# Patient Record
Sex: MALE | Race: WHITE | NOT HISPANIC OR LATINO | Employment: FULL TIME | URBAN - METROPOLITAN AREA
[De-identification: names, ages, dates, MRNs, and addresses within clinical notes are randomized per-mention and may not be internally consistent; named-entity substitution may affect disease eponyms.]

---

## 2017-10-02 ENCOUNTER — APPOINTMENT (OUTPATIENT)
Dept: ADMISSIONS | Facility: MEDICAL CENTER | Age: 52
End: 2017-10-02
Attending: SURGERY
Payer: COMMERCIAL

## 2017-10-10 ENCOUNTER — HOSPITAL ENCOUNTER (OUTPATIENT)
Facility: MEDICAL CENTER | Age: 52
End: 2017-10-10
Attending: SURGERY | Admitting: SURGERY
Payer: COMMERCIAL

## 2017-10-10 VITALS
OXYGEN SATURATION: 99 % | DIASTOLIC BLOOD PRESSURE: 92 MMHG | BODY MASS INDEX: 23.05 KG/M2 | SYSTOLIC BLOOD PRESSURE: 124 MMHG | TEMPERATURE: 97.4 F | RESPIRATION RATE: 14 BRPM | HEART RATE: 84 BPM | WEIGHT: 173.94 LBS | HEIGHT: 73 IN

## 2017-10-10 PROBLEM — K40.91 UNILATERAL RECURRENT INGUINAL HERNIA: Status: ACTIVE | Noted: 2017-10-10

## 2017-10-10 PROCEDURE — 502240 HCHG MISC OR SUPPLY RC 0272: Performed by: SURGERY

## 2017-10-10 PROCEDURE — 160036 HCHG PACU - EA ADDL 30 MINS PHASE I: Performed by: SURGERY

## 2017-10-10 PROCEDURE — 160047 HCHG PACU  - EA ADDL 30 MINS PHASE II: Performed by: SURGERY

## 2017-10-10 PROCEDURE — 160028 HCHG SURGERY MINUTES - 1ST 30 MINS LEVEL 3: Performed by: SURGERY

## 2017-10-10 PROCEDURE — 500123 HCHG BOVIE, CONTROL W/BLADE: Performed by: SURGERY

## 2017-10-10 PROCEDURE — 500800 HCHG LAPAROSCOPIC J/L HOOK: Performed by: SURGERY

## 2017-10-10 PROCEDURE — 160025 RECOVERY II MINUTES (STATS): Performed by: SURGERY

## 2017-10-10 PROCEDURE — 502571 HCHG PACK, LAP CHOLE: Performed by: SURGERY

## 2017-10-10 PROCEDURE — 500048 HCHG BALLOON, TROCAR FOR HERNIA: Performed by: SURGERY

## 2017-10-10 PROCEDURE — 160009 HCHG ANES TIME/MIN: Performed by: SURGERY

## 2017-10-10 PROCEDURE — 160048 HCHG OR STATISTICAL LEVEL 1-5: Performed by: SURGERY

## 2017-10-10 PROCEDURE — 700102 HCHG RX REV CODE 250 W/ 637 OVERRIDE(OP)

## 2017-10-10 PROCEDURE — 700111 HCHG RX REV CODE 636 W/ 250 OVERRIDE (IP)

## 2017-10-10 PROCEDURE — 160046 HCHG PACU - 1ST 60 MINS PHASE II: Performed by: SURGERY

## 2017-10-10 PROCEDURE — 160039 HCHG SURGERY MINUTES - EA ADDL 1 MIN LEVEL 3: Performed by: SURGERY

## 2017-10-10 PROCEDURE — 160035 HCHG PACU - 1ST 60 MINS PHASE I: Performed by: SURGERY

## 2017-10-10 PROCEDURE — C1781 MESH (IMPLANTABLE): HCPCS | Performed by: SURGERY

## 2017-10-10 PROCEDURE — A9270 NON-COVERED ITEM OR SERVICE: HCPCS

## 2017-10-10 PROCEDURE — 500514 HCHG ENDOCLIP: Performed by: SURGERY

## 2017-10-10 PROCEDURE — 501583 HCHG TROCAR, THRD CAN&SEAL 5X100: Performed by: SURGERY

## 2017-10-10 PROCEDURE — 160002 HCHG RECOVERY MINUTES (STAT): Performed by: SURGERY

## 2017-10-10 PROCEDURE — 700101 HCHG RX REV CODE 250

## 2017-10-10 PROCEDURE — 501570 HCHG TROCAR, SEPARATOR: Performed by: SURGERY

## 2017-10-10 PROCEDURE — 501838 HCHG SUTURE GENERAL: Performed by: SURGERY

## 2017-10-10 PROCEDURE — 502691 HCHG STAPLER ABSORBATACK FIXTN: Performed by: SURGERY

## 2017-10-10 DEVICE — MESH 3D MAX LEFT LG - (1EA/CA): Type: IMPLANTABLE DEVICE | Status: FUNCTIONAL

## 2017-10-10 RX ORDER — LIDOCAINE HYDROCHLORIDE 10 MG/ML
0.5 INJECTION, SOLUTION INFILTRATION; PERINEURAL
Status: DISCONTINUED | OUTPATIENT
Start: 2017-10-10 | End: 2017-10-10 | Stop reason: HOSPADM

## 2017-10-10 RX ORDER — BUPIVACAINE HYDROCHLORIDE AND EPINEPHRINE 5; 5 MG/ML; UG/ML
INJECTION, SOLUTION EPIDURAL; INTRACAUDAL; PERINEURAL
Status: DISCONTINUED | OUTPATIENT
Start: 2017-10-10 | End: 2017-10-10 | Stop reason: HOSPADM

## 2017-10-10 RX ORDER — ACETAMINOPHEN 500 MG
500 TABLET ORAL ONCE
COMMUNITY

## 2017-10-10 RX ORDER — OXYCODONE HCL 5 MG/5 ML
SOLUTION, ORAL ORAL
Status: COMPLETED
Start: 2017-10-10 | End: 2017-10-10

## 2017-10-10 RX ORDER — SODIUM CHLORIDE, SODIUM LACTATE, POTASSIUM CHLORIDE, CALCIUM CHLORIDE 600; 310; 30; 20 MG/100ML; MG/100ML; MG/100ML; MG/100ML
INJECTION, SOLUTION INTRAVENOUS CONTINUOUS
Status: DISCONTINUED | OUTPATIENT
Start: 2017-10-10 | End: 2017-10-10 | Stop reason: HOSPADM

## 2017-10-10 RX ORDER — LIDOCAINE AND PRILOCAINE 25; 25 MG/G; MG/G
1 CREAM TOPICAL
Status: DISCONTINUED | OUTPATIENT
Start: 2017-10-10 | End: 2017-10-10 | Stop reason: HOSPADM

## 2017-10-10 RX ORDER — OXYCODONE HYDROCHLORIDE 5 MG/1
5-10 TABLET ORAL EVERY 4 HOURS PRN
Qty: 40 TAB | Refills: 0 | Status: SHIPPED | OUTPATIENT
Start: 2017-10-10

## 2017-10-10 RX ADMIN — SODIUM CHLORIDE, SODIUM LACTATE, POTASSIUM CHLORIDE, CALCIUM CHLORIDE: 600; 310; 30; 20 INJECTION, SOLUTION INTRAVENOUS at 07:58

## 2017-10-10 RX ADMIN — FENTANYL CITRATE 25 MCG: 50 INJECTION, SOLUTION INTRAMUSCULAR; INTRAVENOUS at 11:16

## 2017-10-10 RX ADMIN — FENTANYL CITRATE 25 MCG: 50 INJECTION, SOLUTION INTRAMUSCULAR; INTRAVENOUS at 11:21

## 2017-10-10 RX ADMIN — OXYCODONE HYDROCHLORIDE 5 MG: 5 SOLUTION ORAL at 11:00

## 2017-10-10 ASSESSMENT — PAIN SCALES - GENERAL
PAINLEVEL_OUTOF10: 2
PAINLEVEL_OUTOF10: 2
PAINLEVEL_OUTOF10: 0
PAINLEVEL_OUTOF10: 2
PAINLEVEL_OUTOF10: 0
PAINLEVEL_OUTOF10: 4
PAINLEVEL_OUTOF10: 2

## 2017-10-10 NOTE — H&P
"Surgical History and Physical    Date of Service: 10/10/2017    PCP: Derrick Craig M.D.    CC: Symptomatic left inguinal hernia    HPI: This is a 51 y.o. male who is presenting for repair of the above.    Past Medical History:  No past medical history on file.    Past Surgical History:  Past Surgical History:   Procedure Laterality Date   • OTHER  2005/2011    Hernia X2.       Medications:  Current Facility-Administered Medications   Medication Dose Route Frequency Provider Last Rate Last Dose   • lidocaine-prilocaine (EMLA) 2.5-2.5 % cream 1 Application  1 Application Topical Once PRN Lacho Garvin M.D.        Or   • lidocaine (XYLOCAINE) 1%  injection  0.5 mL Intradermal Once PRN Lacho Garvin M.D.       • lactated ringers infusion   Intravenous Continuous Lacho Garvin M.D. 10 mL/hr at 10/10/17 0758         Social History:  Social History     Social History   • Marital status:      Spouse name: N/A   • Number of children: N/A   • Years of education: N/A     Occupational History   • Not on file.     Social History Main Topics   • Smoking status: Never Smoker   • Smokeless tobacco: Current User     Types: Snuff, Chew   • Alcohol use No   • Drug use:      Types: Inhaled      Comment: Marijuana - \"weekends\". Last use 48 hours ago   • Sexual activity: Not on file     Other Topics Concern   • Not on file     Social History Narrative   • No narrative on file       Family History:  History reviewed. No pertinent family history.    Allergies:  Seasonal    Review of Systems:  Constitutional: Negative for fever, chills, weight loss, malaise/fatigue and diaphoresis.   HENT: Negative for hearing loss, ear pain, nosebleeds, congestion, sore throat, neck pain, tinnitus and ear discharge.    Eyes: Negative for blurred vision, double vision, photophobia, pain, discharge and redness.   Respiratory: Negative for cough, hemoptysis, sputum production, shortness of breath, wheezing and stridor.    Cardiovascular: " "Negative for chest pain, palpitations, orthopnea, claudication, leg swelling and PND.   Gastrointestinal: Negative for heartburn, nausea, vomiting, abdominal pain, diarrhea, constipation, blood in stool and melena.   Genitourinary: Negative for dysuria, urgency, frequency, hematuria and flank pain.   Musculoskeletal: Negative for myalgias, back pain, joint pain and falls.   Skin: Negative for itching and rash.  Neurological: Negative for dizziness, tingling, tremors, sensory change, speech change, focal weakness, seizures, loss of consciousness, weakness and headaches.   Endo/Heme/Allergies: Negative for environmental allergies and polydipsia. Does not bruise/bleed easily.   Psychiatric/Behavioral: Negative for depression, suicidal ideas, hallucinations, memory loss and substance abuse. The patient is not nervous/anxious and does not have insomnia.    Physical Exam:  Blood pressure 124/92, pulse 84, temperature 37.1 °C (98.8 °F), resp. rate 17, height 1.854 m (6' 1\"), weight 78.9 kg (173 lb 15.1 oz), SpO2 95 %.    GENERAL:  The patient is healthy-appearing and in no acute distress  HEENT:  Atraumatic, normocephalic.  Normal pinna bilaterally.  External auditory canals are without discharge.  Conjunctivae and sclerae are clear. Extraocular movements are full. Pupils are equal, round, and reactive to light.  Oral mucosa is moist.  NECK:  Soft and supple without lymphadenopathy. No masses are noted.  Trachea is midline.  CHEST:  Lungs are clear to auscultation bilaterally.  No masses, lesions, or signs of trauma were noted.    CARDIOVASCULAR:  Regular rate and rhythm.  No murmurs appreciated.  No JVD.  Palpable pulses present in all four extremities.    ABDOMEN:  Soft, non-tender, non-distended.  Non-tympanitic.  No hepatomegaly or splenomegaly.  Moderate sized, reducible left inguinal hernia is present.  GENITOURINARY:  The patient has normal external reproductive anatomy.  LYMPHATIC:  There is no adenopathy in the " cervical, supraclavicular, infraclavicular, axillary or inguinal regions.  SKIN:  Warm and well perfused. No rashes.  NEUROLOGIC:  Alert and oriented. Cranial nerves II through XII are grossly intact. Motor and sensory exams are normal in all four extremities. Motor and sensory reflexes are 2+ and symmetric with bilateral plantar responses.  PSYCHIATRIC: Affect and mood is appropriate for age and condition.    Assessment:      Active Hospital Problems    Diagnosis   • Unilateral recurrent inguinal hernia [K40.91]   Left sided.  Risks and benefits of laparoscopic repair discussed in clinic.    Plan  To OR    ____________________________________   Tanner ROTH / FLO     DD: 10/10/2017   DT: 8:30 AM

## 2017-10-10 NOTE — OP REPORT
DATE OF OPERATION: 10/10/2017     PREOPERATIVE DIAGNOSIS: Recurrent left symptomatic inguinal hernia    POSTOPERATIVE DIAGNOSIS: SAME     PROCEDURE PERFORMED: Laparoscopic Left Inguinal Hernia Repair with Mesh.     SURGEON: Tanner Gavrin MD    ASSISTANT: Riddhi Ortiz PA-C    ANESTHESIOLOGIST: Lakeisha Enrique MD., MD     ANESTHESIA: General endotracheal anesthesia.     FINDINGS: Pantaloon type inguinal hernia with direct and indirect (larger) components.  Left side specific, size large 3D MAX laparoscopic mesh was used for the repair.    SPECIMEN: NONE.     ESTIMATED BLOOD LOSS: 15 mL.     PROCEDURE: Informed consent was obtained pre-operatively.  The patient voluntarily voided their urinary bladder just prior to being brought back to the OR.  The patient was taken back to the operating room and placed in supine position.  General endotracheal anesthesia was administered and the patient was intubated. Intravenous antibiotics were administered by the anesthesiologist in correct time interval. Sequential compression devices were placed. All bony prominences were protected.  The abdomen was prepped and draped in a sterile fashion.  A time-out was performed and the patient and procedure were both verified.      Marcaine 0.5% with epinephrine was used to infiltrate all port sites. A 2cm oblique incision was made along the right lower umbilicus.  The subcutaneous tissues were spread bluntly to expose the fascia.  The anterior fascia was open sharply along the entire length of the incision to expose the right rectus muscular bundle.  This muscular bundle was swept laterally to expose the peritoneum.  A 10mm dissecting balloon was then guided through the incision, along the pre-peritoneal space, and down to the pubic symphysis.  The balloon was then hand-pump inflated under direct visualization.  The balloon was deflated and removed.  A 10mm balloon port was placed through the incision, the balloon was inflated, and the  collar was cinched down to the skin level to secure the port.  Gas was applied to the port and pneumo-preperitoneum was achieved.  A 10mm laparoscope was placed through the port.  There was no evidence of major vascular injury or injury to the peritoneum.  Dissection by the dissecting balloon was excellent.    Two 5mm ports were placed in the lower midline between the umbilicus and the pubic symphysis under direct visualization.  Dissection was begun on the left side.  The epigastric vascular bundle was identified in its usual location.  The loose areolar tissue was swept down laterally to free the peritoneum.  Dissection continued medially until the cord structures were identified.  The cord was carefully dissected free from surrounding attachments.  The vas deferens was identified and protected.  The femoral, direct, and indirect spaces were all explored and the above mentioned findings were noted.  The area of dissection was noted to be hemostatic.    Pre-peritoneal fat was cleared off the pubic symphysis and Zoltan's ligament on the left side.  A left, large 3D Max mesh was introduced into the pre-peritoneal space through the 10mm port.  It was laid out flat and care was taken to cover the femoral, direct, and indirect spaces entirely.  The mesh was secured to the pubic symphysis and Copper's ligament each with a single absorbable tack.  The mesh overlapped the midline by 1cm.  The gas was released slowly and the meshes appeared to lay in the pre-peritoneal space without wrinkles or folds.  The 10mm port was removed.  The scotty-umbilical port site fascia was closed with an 0 vicryl suture.  The fascia was noted to be tight and well approximated.  All remaining ports were then removed.  All skin incisions were closed with interrupted 4-0 Vicryl subcuticular sutures and dressed with Dermabond.     The patient tolerated the procedure well and there were no immediate apparent complications. All sponge, sharps, and  instrument counts were correct on 2 separate occasions. The patient was awakened, extubated, and transferred to the PACU in satisfactory condition.               NSQIP Post-Operative Data:    Emergency Case?----- No   Wound Classification?----- Clean  Wound Closure?----- All Layers  ASA Classification?----- I  E    ____________________________________   Tanner ROTH / FLO     DD: 10/10/2017   DT: 10:20 AM

## 2017-10-10 NOTE — DISCHARGE INSTRUCTIONS
ACTIVITY: Rest and take it easy for the first 24 hours.  A responsible adult is recommended to remain with you during that time.  It is normal to feel sleepy.  We encourage you to not do anything that requires balance, judgment or coordination.    MILD FLU-LIKE SYMPTOMS ARE NORMAL. YOU MAY EXPERIENCE GENERALIZED MUSCLE ACHES, THROAT IRRITATION, HEADACHE AND/OR SOME NAUSEA.    FOR 24 HOURS DO NOT:  Drive, operate machinery or run household appliances.  Drink beer or alcoholic beverages.   Make important decisions or sign legal documents.    SPECIAL INSTRUCTIONS: Inguinal Hernia Repair Discharge Instructions:     1. DIET: After discharge from the hospital you may resume your normal preoperative diet without restrictions.     2. ACTIVITIES: After discharge from the hospital, you may resume full routine activities. Heavy lifting (over 15 pounds) and strenuous activities will make your incision sore and should be avoided for one month after surgery. Routine activities of daily living such as walking, going up and down stairs are acceptable.     3. DRIVING: You may drive whenever you are no longer taking narcotic pain medications and are able to perform the activities needed to drive safely, i.e. turning, bending, twisting, etc.     4. WOUND/BATHING: You may get the wound wet at any time after leaving the hospital. You may shower, but do not submerge in a bath or pool until seen for follow up. You may remove the skin glue using a finger or a pair of tweezers in one week.     5. BOWEL FUNCTION: The combination of pain medication and decreased activity level can cause constipation in otherwise normal patients. If you feel this is occurring, take a laxative (Milk of Magnesia, Ex-Lax, Senokot, Miralax, Magnesium Citrate) until the problem has resolved.     6. PAIN MEDICATION: You will be given a prescription for pain medication at discharge. Please take these as directed. It is advisable to take your medication around the  clock for the first 24 to 48 hours. You may continue any non-steroidal anti-inflammatory medications (NSAIDs) such as Advil in the post-operative period. These may and should be taken with your narcotic pain medication. It is important to remember not to take medications on an empty stomach as this may cause nausea. Do not consume alcohol while taking pain medication.     7. WHAT TO EXPECT: You may develop swelling and bruising at the base of your genitalia and within the scrotum (males) or labias (females).  This is due to bleeding from the operative site tracking down into these areas.  The bleeding typically stops within a few hours after surgery.  The bruising may take several weeks to resolve.       8. CALL IF YOU HAVE: (1) Fevers to more than 101F, (2) Unusual chest or leg pain, (3) Drainage or fluid from incision that may be foul smelling, increased tenderness or soreness at the wound or the wound edges are no longer together, redness or swelling at the incision site.  (4) Profound swelling at the incision site or in the genitals.     9. FOLLOW-UP: Call and schedule a follow up appointment in 2 weeks.     If you have any additional questions at all, please do not hesitate to call the office and speak to my medical assistant, myself, or the physician on call.     [address] [facility], [address]   [place] [address]     Tanner Garvin MD   [facility]   [phone]    DIET: To avoid nausea, slowly advance diet as tolerated, avoiding spicy or greasy foods for the first day.  Add more substantial food to your diet according to your physician's instructions.  Babies can be fed formula or breast milk as soon as they are hungry.  INCREASE FLUIDS AND FIBER TO AVOID CONSTIPATION.    SURGICAL DRESSING/BATHING: see above    FOLLOW-UP APPOINTMENT:  A follow-up appointment should be arranged with your doctor in 2 weeks; call to schedule.    You should CALL YOUR PHYSICIAN if you develop:  Fever greater than 101 degrees  F.  Pain not relieved by medication, or persistent nausea or vomiting.  Excessive bleeding (blood soaking through dressing) or unexpected drainage from the wound.  Extreme redness or swelling around the incision site, drainage of pus or foul smelling drainage.  Inability to urinate or empty your bladder within 8 hours.  Problems with breathing or chest pain.    You should call 911 if you develop problems with breathing or chest pain.  If you are unable to contact your doctor or surgical center, you should go to the nearest emergency room or urgent care center.  Physician's telephone #: [phone]    If any questions arise, call your doctor.  If your doctor is not available, please feel free to call the Surgical Center at [phone].  The Center is open Monday through Friday from 7AM to 7PM.  You can also call the [facility] HOTLINE open 24 hours/day, 7 days/week and speak to a nurse at [phone], or toll free at [phone].    A registered nurse may call you a few days after your surgery to see how you are doing after your procedure.    MEDICATIONS: Resume taking daily medication.  Take prescribed pain medication with food.  If no medication is prescribed, you may take non-aspirin pain medication if needed.  PAIN MEDICATION CAN BE VERY CONSTIPATING.  Take a stool softener or laxative such as senokot, pericolace, or milk of magnesia if needed.    Prescription given for Roxicodone.  Last pain medication given at 1100.    If your physician has prescribed pain medication that includes Acetaminophen (Tylenol), do not take additional Acetaminophen (Tylenol) while taking the prescribed medication.    Depression / Suicide Risk    As you are discharged from this Critical access hospital facility, it is important to learn how to keep safe from harming yourself.    Recognize the warning signs:  · Abrupt changes in personality, positive or negative- including increase in energy   · Giving away possessions  · Change in eating  patterns- significant weight changes-  positive or negative  · Change in sleeping patterns- unable to sleep or sleeping all the time   · Unwillingness or inability to communicate  · Depression  · Unusual sadness, discouragement and loneliness  · Talk of wanting to die  · Neglect of personal appearance   · Rebelliousness- reckless behavior  · Withdrawal from people/activities they love  · Confusion- inability to concentrate     If you or a loved one observes any of these behaviors or has concerns about self-harm, here's what you can do:  · Talk about it- your feelings and reasons for harming yourself  · Remove any means that you might use to hurt yourself (examples: pills, rope, extension cords, firearm)  · Get professional help from the community (Mental Health, Substance Abuse, psychological counseling)  · Do not be alone:Call your Safe Contact- someone whom you trust who will be there for you.  · Call your local CRISIS HOTLINE 549-2510 or 968-557-0878  · Call your local Children's Mobile Crisis Response Team [place] (074) 832-6401 or www.[facility]  · Call the toll free National Suicide Prevention Hotlines   · National Suicide Prevention Lifeline 924-059-BXKM (0210)  · National Hope Line Network 800-SUICIDE (891-1354)

## 2024-12-15 ENCOUNTER — OFFICE VISIT (OUTPATIENT)
Dept: URGENT CARE | Facility: CLINIC | Age: 59
End: 2024-12-15
Payer: COMMERCIAL

## 2024-12-15 VITALS
SYSTOLIC BLOOD PRESSURE: 136 MMHG | DIASTOLIC BLOOD PRESSURE: 92 MMHG | HEIGHT: 72 IN | OXYGEN SATURATION: 97 % | TEMPERATURE: 97.1 F | RESPIRATION RATE: 16 BRPM | WEIGHT: 154 LBS | HEART RATE: 76 BPM | BODY MASS INDEX: 20.86 KG/M2

## 2024-12-15 DIAGNOSIS — J01.90 ACUTE VIRAL SINUSITIS: ICD-10-CM

## 2024-12-15 DIAGNOSIS — B97.89 ACUTE VIRAL SINUSITIS: ICD-10-CM

## 2024-12-15 PROCEDURE — 99213 OFFICE O/P EST LOW 20 MIN: CPT

## 2024-12-15 RX ORDER — DEXAMETHASONE SODIUM PHOSPHATE 10 MG/ML
10 INJECTION INTRAMUSCULAR; INTRAVENOUS ONCE
Status: COMPLETED | OUTPATIENT
Start: 2024-12-15 | End: 2024-12-15

## 2024-12-15 RX ADMIN — DEXAMETHASONE SODIUM PHOSPHATE 10 MG: 10 INJECTION INTRAMUSCULAR; INTRAVENOUS at 15:31

## 2024-12-15 ASSESSMENT — ENCOUNTER SYMPTOMS
DIZZINESS: 0
SPUTUM PRODUCTION: 0
WHEEZING: 0
COUGH: 0
SORE THROAT: 0
SHORTNESS OF BREATH: 0
WEAKNESS: 0
BLOOD IN STOOL: 0
CHILLS: 0
FEVER: 0
EYE DISCHARGE: 0
SINUS PAIN: 1
BLURRED VISION: 0
NAUSEA: 0
HEADACHES: 0
PSYCHIATRIC NEGATIVE: 1
DIAPHORESIS: 0
VOMITING: 0
MYALGIAS: 0
DIARRHEA: 0
ABDOMINAL PAIN: 0

## 2024-12-15 NOTE — PROGRESS NOTES
Subjective:   Ibrahima Omer is a 59 y.o. male who presents for Sinus Problem (Sinus pressure, fatigue x 1 week)      HPI  Patient complains of sinus pressure and fatigue for one week.    Negative: shortness of breath, cough, sputum production, wheezing, dyspnea, rhonchi, hypoxia, respiratory distress, chest pain, body aches, fever, sore throat, muffled voice, drooling, dizziness, fatigue, weakness, sleep disturbance, post nasal drip,  vision changes, abdominal pain, nausea, vomiting, diarrhea, recent travel           Review of Systems   Constitutional:  Negative for chills, diaphoresis, fever and malaise/fatigue.   HENT:  Positive for sinus pain. Negative for congestion, ear pain and sore throat.    Eyes:  Negative for blurred vision and discharge.   Respiratory:  Negative for cough, sputum production, shortness of breath and wheezing.    Cardiovascular:  Negative for chest pain.   Gastrointestinal:  Negative for abdominal pain, blood in stool, diarrhea, nausea and vomiting.   Genitourinary: Negative.    Musculoskeletal:  Negative for myalgias.   Skin:  Negative for rash.   Neurological:  Negative for dizziness, weakness and headaches.   Endo/Heme/Allergies: Negative.    Psychiatric/Behavioral: Negative.     All other systems reviewed and are negative.      Medical History:  History reviewed. No pertinent past medical history.    Allergies:  Allergies   Allergen Reactions    Seasonal        Social history, surgical history, medications, and current problem list reviewed today in Epic.       Objective:     BP (!) 136/92 (BP Location: Left arm, Patient Position: Sitting, BP Cuff Size: Adult)   Pulse 76   Temp 36.2 °C (97.1 °F) (Temporal)   Resp 16   Ht 1.829 m (6')   Wt 69.9 kg (154 lb)   SpO2 97%     Physical Exam  Vitals reviewed.   Constitutional:       General: He is not in acute distress.     Appearance: Normal appearance. He is not ill-appearing, toxic-appearing or diaphoretic.   HENT:       Head: Normocephalic.      Jaw: There is normal jaw occlusion.      Right Ear: Tympanic membrane, ear canal and external ear normal.      Left Ear: Tympanic membrane, ear canal and external ear normal.      Nose: Congestion present. No rhinorrhea.      Right Sinus: No maxillary sinus tenderness or frontal sinus tenderness.      Left Sinus: No maxillary sinus tenderness or frontal sinus tenderness.      Mouth/Throat:      Lips: Pink.      Mouth: Mucous membranes are moist.      Tongue: Tongue does not deviate from midline.      Pharynx: Oropharynx is clear. Uvula midline. No oropharyngeal exudate, posterior oropharyngeal erythema or uvula swelling.   Eyes:      Extraocular Movements: Extraocular movements intact.      Conjunctiva/sclera: Conjunctivae normal.      Pupils: Pupils are equal, round, and reactive to light.   Cardiovascular:      Rate and Rhythm: Normal rate and regular rhythm.      Pulses: Normal pulses.      Heart sounds: Normal heart sounds.   Pulmonary:      Effort: Pulmonary effort is normal.      Breath sounds: Normal breath sounds.   Abdominal:      General: Abdomen is flat.      Palpations: Abdomen is soft.      Tenderness: There is no abdominal tenderness.   Musculoskeletal:         General: Normal range of motion.      Cervical back: Normal range of motion and neck supple.   Lymphadenopathy:      Cervical: No cervical adenopathy.   Skin:     General: Skin is warm.      Capillary Refill: Capillary refill takes less than 2 seconds.   Neurological:      General: No focal deficit present.      Mental Status: He is alert and oriented to person, place, and time.   Psychiatric:         Mood and Affect: Mood normal.         Behavior: Behavior normal.         Assessment/Plan:       Diagnosis and associated orders:     1. Acute viral sinusitis  - dexamethasone (Decadron) injection (check route below) 10 mg     Comments/MDM:   I personally reviewed prior external notes and test results pertinent to today's  visit as well as additional imaging and testing completed in clinic today.      59-year-old afebrile, hemodynamically stable male presenting with complaints of sinus congestion.  Normal pulmonary exam.  Normal ENT exam outside of nasal congestion.  No concern for bacterial sinus infection at this time.  I did discuss viral first bacterial etiology of sinus infections.  Patient encouraged to increase fluids and rest, cool-mist humidifier, zinc 75 mg daily, saline nasal rinse with distilled water, cough drops, salt water gargles, tylenol or ibuprofen for fever and/or bodyaches.  At this time I do not believe antibiotics would improve patient's symptoms.  I do believe the patient would benefit from an in clinic dose of Decadron, patient agreeable to this plan of care.  Patient encouraged to follow-up with the clinic in 48 hours for re-evaluation as needed.  Patient given strict instructions to follow up with the emergency room if they develop worsening symptoms.  Patient verbalized understanding.      Patient is clinically stable at today's acute urgent care visit. Vital signs are normal and reassuring.  No acute distress noted. Appropriate for outpatient management at this time. No red flag warnings noted.  Patient given strict instructions to follow up with emergency room if they develop any red flag warnings which were discussed in depth.  They verbalized understanding.      Differential diagnosis, natural history, supportive care, and indications for immediate follow-up discussed. All questions answered. Patient agrees with the plan of care. Advised the patient to follow-up with the primary care physician for recheck, reevaluation, and consideration of further management or the emergency room for worsening symptoms.      Please note that this dictation was created using voice recognition software. I have made every reasonable attempt to correct obvious errors, but I expect that there are errors of grammar and  possibly content that I did not discover before finalizing the note.

## 2024-12-15 NOTE — PATIENT INSTRUCTIONS
Education:  -A cough can persist for up to 6 weeks.  -Increase fluids and rest.  -Cool-mist humidifier for nighttime use.   -Practice good hand hygiene.  -Zinc 25 mg has been shown to be effective in reducing the duration of cold symptoms.   -You may use either acetaminophen or ibuprofen over-the-counter every 6-8 hours for pain or fever if that is not contraindicated with your body.    -Saline Nasal Spray and Flonase may be used for congestion. Nasal saline in the nose helps to help break up nasal secretions, and is beneficial for nasal symptoms and throat pain.  -Saline Nasal Rinse with a Neti pot or Syed med rinse can be used multiple times a day. Be sure to use distilled water or boil tap water for 10 mins. Add a saline packet. Be sure the water is not too hot (around your body temp of 98 degrees)  -Decongestants are not recommended as they can have a rebound congestion effect along with many side effects (especially if you have high blood pressure).   -Chloraseptic lozenge, warm tea with honey or  throat spray to help with discomfort.  -Salt water gargles for sore throat several times a day.

## 2025-03-01 ENCOUNTER — OFFICE VISIT (OUTPATIENT)
Dept: URGENT CARE | Facility: CLINIC | Age: 60
End: 2025-03-01
Payer: COMMERCIAL

## 2025-03-01 VITALS
DIASTOLIC BLOOD PRESSURE: 70 MMHG | TEMPERATURE: 98.8 F | SYSTOLIC BLOOD PRESSURE: 126 MMHG | RESPIRATION RATE: 15 BRPM | OXYGEN SATURATION: 96 % | HEIGHT: 72 IN | HEART RATE: 81 BPM | BODY MASS INDEX: 21.54 KG/M2 | WEIGHT: 159 LBS

## 2025-03-01 DIAGNOSIS — R09.81 CONGESTION OF NASAL SINUS: ICD-10-CM

## 2025-03-01 PROCEDURE — 99213 OFFICE O/P EST LOW 20 MIN: CPT | Performed by: STUDENT IN AN ORGANIZED HEALTH CARE EDUCATION/TRAINING PROGRAM

## 2025-03-01 PROCEDURE — 3078F DIAST BP <80 MM HG: CPT | Performed by: STUDENT IN AN ORGANIZED HEALTH CARE EDUCATION/TRAINING PROGRAM

## 2025-03-01 PROCEDURE — 3074F SYST BP LT 130 MM HG: CPT | Performed by: STUDENT IN AN ORGANIZED HEALTH CARE EDUCATION/TRAINING PROGRAM

## 2025-03-01 NOTE — PROGRESS NOTES
"Subjective:   Ibrahima Omer is a 59 y.o. male who presents for Sinus Problem (Started Wednesday night, fatigue and sinus pressure )      HPI:    5-year-old male presents with sinus congestion and fatigue that started on Wednesday evening.  He reports he had a similar episode multiple times every single time he turns into a \" bacterial infection\" and he needs either antibiotics or steroid to be can go away.  He reports fatigue and generalized malaise during these episodes.  He reports he tried Claritin-D for 1 day with no significant improvement.  - On previous instances he tried Mucinex with no improvement  -  he denies any fevers or chills  - He denies any significant cough  - He reports some mild aches in his legs  -He reports ear pain     ROS    Medications:    acetaminophen Tabs  VIAGRA PO    Allergies: Seasonal    Problem List: Ibrahima Omer does not have any pertinent problems on file.    Surgical History:  Past Surgical History:   Procedure Laterality Date    INGUINAL HERNIA LAPAROSCOPIC Left 10/10/2017    Procedure: INGUINAL HERNIA LAPAROSCOPIC;  Surgeon: Lacho Garvin M.D.;  Location: SURGERY [facility];  Service: General    OTHER  2005/2011    Hernia X2.       Past Social Hx: Ibrahima Omer  reports that he has never smoked. His smokeless tobacco use includes snuff and chew. He reports that he does not currently use drugs after having used the following drugs: Inhaled. He reports that he does not drink alcohol.     Past Family Hx:  Ibrahima Omer family history is not on file.     Problem list, medications, and allergies reviewed by myself today in Epic.     Objective:     /70 (BP Location: Left arm, Patient Position: Sitting, BP Cuff Size: Adult)   Pulse 81   Temp 37.1 °C (98.8 °F) (Temporal)   Resp 15   Ht 1.829 m (6')   Wt 72.1 kg (159 lb)   SpO2 96%   BMI 21.56 kg/m²     Physical Exam  Constitutional:       Appearance: Normal " appearance.   HENT:      Head: Normocephalic and atraumatic.      Right Ear: Tympanic membrane normal.      Left Ear: Tympanic membrane normal.      Nose: Congestion and rhinorrhea present.      Mouth/Throat:      Mouth: Mucous membranes are moist.      Pharynx: Oropharynx is clear. No oropharyngeal exudate or posterior oropharyngeal erythema.   Cardiovascular:      Rate and Rhythm: Normal rate and regular rhythm.      Pulses: Normal pulses.      Heart sounds: Normal heart sounds.   Pulmonary:      Effort: Pulmonary effort is normal.      Breath sounds: Normal breath sounds.   Neurological:      Mental Status: He is alert.         Assessment/Plan:     Diagnosis and associated orders:     1. Congestion of nasal sinus           Comments/MDM:     1. Congestion of nasal sinus  On examination patient requested either steroid or an antibiotic to fix his ailment.  - I discussed with patient that the likely diagnosis at this time was a viral upper respiratory illness but he would not like additional testing.  - I discussed that typical viral upper respiratory illnesses are treated symptomatically.  I recommend use of an expectorant/mucolytic such as Mucinex, daily nasal steroid such as Flonase or Nasacort, daily antihistamine and frequent nasal rinses.  - I discussed that I study showed the majority of sinus congestion in the first 10 days of illness are viral in nature.  - We discussed return precautions including worsening symptoms, development of fever chills any shortness of breath any significant sinus pain or ear pain or symptoms lasting greater than 10 days.  - At this point patient asked if I was going to give him antibiotic or steroid injection/Dosepak.  I discussed with patient that this is not indicated at this time and I would not be prescribing this.  He then proceeded to slam the door open and stormed out of the clinic saying he does not need to be here and is a waste of his time.           Differential  diagnosis, natural history, supportive care, and indications for immediate follow-up discussed.    Advised the patient to follow-up with the primary care physician for recheck, reevaluation, and consideration of further management.    Please note that this dictation was created using voice recognition software. I have made a reasonable attempt to correct obvious errors, but I expect that there are errors of grammar and possibly content that I did not discover before finalizing the note.    Alexis Live M.D.

## (undated) DEVICE — SET LEADWIRE 5 LEAD BEDSIDE DISPOSABLE ECG (1SET OF 5/EA)

## (undated) DEVICE — BALLOON TROCAR SPACEMAKER - STRUCTURAL (5/BX)----USE ITEM 14468---

## (undated) DEVICE — APPLIER ENDO MEDIUM CLIP (3EA/BX)

## (undated) DEVICE — DRAPE LARGE 3 QUARTER - (20/CA)

## (undated) DEVICE — GLOVE BIOGEL SZ 7.5 SURGICAL PF LTX - (50PR/BX 4BX/CA)

## (undated) DEVICE — TROCAR 5X100 NON BLADED Z-TH - READ KII (6/BX)

## (undated) DEVICE — ELECTRODE 850 FOAM ADHESIVE - HYDROGEL RADIOTRNSPRNT (50/PK)

## (undated) DEVICE — SENSOR SPO2 NEO LNCS ADHESIVE (20/BX) SEE USER NOTES

## (undated) DEVICE — SUTURE GENERAL

## (undated) DEVICE — GLOVE BIOGEL SZ 6.5 SURGICAL PF LTX (50PR/BX 4BX/CA)

## (undated) DEVICE — LACTATED RINGERS INJ 1000 ML - (14EA/CA 60CA/PF)

## (undated) DEVICE — CANISTER SUCTION 3000ML MECHANICAL FILTER AUTO SHUTOFF MEDI-VAC NONSTERILE LF DISP  (40EA/CA)

## (undated) DEVICE — SUTURE 4-0 MONOCRYL PLUS PS-2 - 27 INCH (36/BX)

## (undated) DEVICE — TUBE E-T HI-LO CUFF 8.0MM (10EA/PK)

## (undated) DEVICE — MASK ANESTHESIA ADULT  - (100/CA)

## (undated) DEVICE — DETERGENT RENUZYME PLUS 10 OZ PACKET (50/BX)

## (undated) DEVICE — TUBING INSUFFLATION - (10/BX)

## (undated) DEVICE — KIT ROOM DECONTAMINATION

## (undated) DEVICE — HEAD HOLDER JUNIOR/ADULT

## (undated) DEVICE — SYSTEM CLEARIFY VISUALIZATION (10EA/PK)

## (undated) DEVICE — GOWN SURGEONS LARGE - (32/CA)

## (undated) DEVICE — TUBING CLEARLINK DUO-VENT - C-FLO (48EA/CA)

## (undated) DEVICE — GLOVE BIOGEL INDICATOR SZ 6.5 SURGICAL PF LTX - (50PR/BX 4BX/CA)

## (undated) DEVICE — ELECTRODE DUAL RETURN W/ CORD - (50/PK)

## (undated) DEVICE — SYRINGE SAFETY TB 1 ML 27 GA X 1/2 IN (100/BX 4BX/CA)

## (undated) DEVICE — CANNULA W/SEAL 5X100 Z-THRE - ADED KII (12/BX)

## (undated) DEVICE — PROTECTOR ULNA NERVE - (36PR/CA)

## (undated) DEVICE — PACK LAP CHOLE OR - (2EA/CA)

## (undated) DEVICE — DEVICESORBAFIX W/30 ABSOB CLP - (5EA/CA)

## (undated) DEVICE — CHLORAPREP 26 ML APPLICATOR - ORANGE TINT(25/CA)

## (undated) DEVICE — PENCIL ELECTSURG 10FT BTN SWH - (50/CA)

## (undated) DEVICE — SPACEMAKER PREP DIST BALLOON - (5/BX) KIT COMPONENT----USE ITEM 14468---

## (undated) DEVICE — KIT ANESTHESIA W/CIRCUIT & 3/LT BAG W/FILTER (20EA/CA)

## (undated) DEVICE — SODIUM CHL IRRIGATION 0.9% 1000ML (12EA/CA)

## (undated) DEVICE — ELECTRODE 5MM LHK LAPSCP STERILE DISP- MEGADYNE  (5/CA)

## (undated) DEVICE — GLOVE SZ 7.5 BIOGEL PI MICRO - PF LF (50PR/BX)

## (undated) DEVICE — GLOVE BIOGEL INDICATOR SZ 7SURGICAL PF LTX - (50/BX 4BX/CA)

## (undated) DEVICE — SUCTION INSTRUMENT YANKAUER BULBOUS TIP W/O VENT (50EA/CA)

## (undated) DEVICE — GLOVE BIOGEL PI INDICATOR SZ 8.0 SURGICAL PF LF -(50/BX 4BX/CA)

## (undated) DEVICE — SET EXTENSION WITH 2 PORTS (48EA/CA) ***PART #2C8610 IS A SUBSTITUTE*****

## (undated) DEVICE — BLADE SURGICAL CLIPPER - (50EA/CA)

## (undated) DEVICE — GOWN WARMING STANDARD FLEX - (30/CA)

## (undated) DEVICE — SUTURE 0 VICRYL PLUS UR-6 - 27 INCH (36/BX)

## (undated) DEVICE — NEPTUNE 4 PORT MANIFOLD - (20/PK)